# Patient Record
Sex: MALE | Race: WHITE | NOT HISPANIC OR LATINO | URBAN - METROPOLITAN AREA
[De-identification: names, ages, dates, MRNs, and addresses within clinical notes are randomized per-mention and may not be internally consistent; named-entity substitution may affect disease eponyms.]

---

## 2017-06-01 ENCOUNTER — IMPORTED ENCOUNTER (OUTPATIENT)
Dept: URBAN - METROPOLITAN AREA CLINIC 50 | Facility: CLINIC | Age: 72
End: 2017-06-01

## 2018-01-18 ENCOUNTER — IMPORTED ENCOUNTER (OUTPATIENT)
Dept: URBAN - METROPOLITAN AREA CLINIC 50 | Facility: CLINIC | Age: 73
End: 2018-01-18

## 2018-05-24 ENCOUNTER — IMPORTED ENCOUNTER (OUTPATIENT)
Dept: URBAN - METROPOLITAN AREA CLINIC 50 | Facility: CLINIC | Age: 73
End: 2018-05-24

## 2018-11-30 ENCOUNTER — IMPORTED ENCOUNTER (OUTPATIENT)
Dept: URBAN - METROPOLITAN AREA CLINIC 50 | Facility: CLINIC | Age: 73
End: 2018-11-30

## 2019-12-06 ENCOUNTER — IMPORTED ENCOUNTER (OUTPATIENT)
Dept: URBAN - METROPOLITAN AREA CLINIC 50 | Facility: CLINIC | Age: 74
End: 2019-12-06

## 2020-04-21 ENCOUNTER — IMPORTED ENCOUNTER (OUTPATIENT)
Dept: URBAN - METROPOLITAN AREA CLINIC 50 | Facility: CLINIC | Age: 75
End: 2020-04-21

## 2020-05-11 ENCOUNTER — IMPORTED ENCOUNTER (OUTPATIENT)
Dept: URBAN - METROPOLITAN AREA CLINIC 50 | Facility: CLINIC | Age: 75
End: 2020-05-11

## 2020-12-07 ENCOUNTER — IMPORTED ENCOUNTER (OUTPATIENT)
Dept: URBAN - METROPOLITAN AREA CLINIC 50 | Facility: CLINIC | Age: 75
End: 2020-12-07

## 2021-04-17 ASSESSMENT — VISUAL ACUITY
OD_CC: 20/25-1
OS_CC: J1+
OD_CC: 20/30-
OS_PH: 20/50
OS_CC: J1+
OS_OTHER: 20/70. 20/>400.
OD_CC: 20/30-2
OS_CC: 20/30
OD_CC: J1
OS_OTHER: 20/50. 20/80.
OS_CC: 20/40
OS_CC: J1+@ 16 IN
OS_CC: 20/30-2
OD_CC: 20/30+2
OD_CC: J1+@ 16 IN
OS_CC: 20/50-1
OS_CC: 20/40-
OD_CC: J1+
OD_CC: 20/25-
OD_CC: 20/30
OS_OTHER: 20/40. 20/40.
OD_CC: J1+@ 15 IN
OS_CC: J1+
OS_CC: J1
OS_OTHER: 20/70. 20/100.
OD_CC: J1+
OS_BAT: 20/70
OS_CC: J1@ 16 IN
OS_OTHER: 20/30. 20/40.
OS_CC: 20/30
OD_CC: 20/30+
OS_BAT: 20/40
OS_BAT: 20/30
OS_CC: J1+@ 15 IN
OS_CC: 20/30+2
OS_BAT: 20/70
OD_CC: J1@ 16 IN
OS_BAT: 20/50
OD_PH: 20/25
OD_CC: J1+

## 2021-04-17 ASSESSMENT — TONOMETRY
OS_IOP_MMHG: 16
OS_IOP_MMHG: 16
OS_IOP_MMHG: 17
OS_IOP_MMHG: 18
OS_IOP_MMHG: 17
OD_IOP_MMHG: 15
OD_IOP_MMHG: 16
OD_IOP_MMHG: 17
OD_IOP_MMHG: 16
OD_IOP_MMHG: 16
OS_IOP_MMHG: 16
OS_IOP_MMHG: 18

## 2021-05-07 ENCOUNTER — PREPPED CHART (OUTPATIENT)
Dept: URBAN - METROPOLITAN AREA CLINIC 49 | Facility: CLINIC | Age: 76
End: 2021-05-07

## 2021-05-17 ENCOUNTER — COMPREHENSIVE EXAM (OUTPATIENT)
Dept: URBAN - METROPOLITAN AREA CLINIC 49 | Facility: CLINIC | Age: 76
End: 2021-05-17

## 2021-05-17 DIAGNOSIS — H18.511: ICD-10-CM

## 2021-05-17 DIAGNOSIS — H35.3130: ICD-10-CM

## 2021-05-17 DIAGNOSIS — H02.831: ICD-10-CM

## 2021-05-17 DIAGNOSIS — H35.372: ICD-10-CM

## 2021-05-17 DIAGNOSIS — H02.834: ICD-10-CM

## 2021-05-17 PROCEDURE — 92134 CPTRZ OPH DX IMG PST SGM RTA: CPT

## 2021-05-17 PROCEDURE — 92012 INTRM OPH EXAM EST PATIENT: CPT

## 2021-05-17 ASSESSMENT — VISUAL ACUITY
OS_CC: 20/25+1
OD_CC: 20/25-1
OS_GLARE: 20/40
OS_GLARE: 20/30
OU_CC: J1
OU_CC: 20/25+2

## 2021-05-17 ASSESSMENT — TONOMETRY
OS_IOP_MMHG: 14
OD_IOP_MMHG: 13

## 2021-08-17 ENCOUNTER — HOSPITAL ENCOUNTER (OUTPATIENT)
Dept: HOSPITAL 53 - M WUC | Age: 76
End: 2021-08-17
Attending: PHYSICIAN ASSISTANT
Payer: COMMERCIAL

## 2021-08-17 DIAGNOSIS — R05: Primary | ICD-10-CM

## 2021-08-17 DIAGNOSIS — R91.8: ICD-10-CM

## 2021-08-17 NOTE — REP
INDICATION:

R05 COUGH



COMPARISON:

None.



TECHNIQUE:

PA and lateral.



FINDINGS:

A right hilar mass cannot be excluded along with adjacent atelectasis and interstitial

changes.  Remainder of lung fields are well aerated and clear.  No effusion.  No

pneumothorax.  Cardiac silhouette is normal.  Skeletal structures intact.



IMPRESSION:

Suspicious opacity in the right hilum suggesting mass with adjacent atelectasis.

Chest CT with contrast is recommended for further investigation.





<Electronically signed by Salvador Bhatia > 08/17/21 [id number]

## 2021-08-23 ENCOUNTER — HOSPITAL ENCOUNTER (OUTPATIENT)
Dept: HOSPITAL 53 - M WUC | Age: 76
End: 2021-08-23
Attending: PHYSICIAN ASSISTANT
Payer: COMMERCIAL

## 2021-08-23 DIAGNOSIS — R05: Primary | ICD-10-CM

## 2021-08-23 LAB
CREAT SERPL-MCNC: 0.95 MG/DL (ref 0.7–1.3)
GFR SERPL CREATININE-BSD FRML MDRD: > 60 ML/MIN/{1.73_M2} (ref 42–?)

## 2021-08-24 LAB — BUN SERPL-MCNC: 15 MG/DL (ref 7–18)

## 2021-08-26 ENCOUNTER — HOSPITAL ENCOUNTER (OUTPATIENT)
Dept: HOSPITAL 53 - M RAD | Age: 76
End: 2021-08-26
Attending: PHYSICIAN ASSISTANT
Payer: COMMERCIAL

## 2021-08-26 DIAGNOSIS — R05: ICD-10-CM

## 2021-08-26 DIAGNOSIS — R91.8: Primary | ICD-10-CM

## 2021-08-26 PROCEDURE — 71260 CT THORAX DX C+: CPT

## 2021-08-27 NOTE — REP
INDICATION:

OPACITY RT LUNG ABN IMAGING



COMPARISON:

None



TECHNIQUE:

Axial contrast enhanced images from the thoracic inlet to the upper abdomen with

coronal and sagittal reformations using 75 ml Isovue 370 intravenous contrast material.



This CT examination was performed using the following dose reduction techniques:

Automated exposure control, adjustment of mA and/or kv according to the patient's

size, and use of iterative reconstruction technique.



FINDINGS:

Large pathologic conglomerate lymph node complex and or pulmonary mass at the right

hilum measures greater than 5.3 cm diameter and demonstrates elements of rim

enhancement with suspected central necrosis.  This lesion completely fills the right

lower lobe and right middle lobe bronchi.  Mediastinal/subcarinal heterogeneous

enhancing pathologic lymph nodes are also identified.  There is a suspicious mass with

cavitary component in the posterior subpleural apical segment right lower lobe

measuring 3.5 cm diameter along with smaller adjacent 13 mm mass and few small

scattered nonspecific pulmonary nodules primarily identified in the right upper lobe

and left lower lobe.



No effusion.  No pneumothorax.  Further evaluation of the mediastinum demonstrates

atherosclerotic changes to the thoracic aorta and coronary arteries without aortic

aneurysm or dissection.  No cardiomegaly or pericardial effusion.



IMPRESSION:

1. 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

apical segment with few scattered pulmonary nodules in the right hemithorax and left

base.  Significant associated mediastinal and right hilar adenopathy as described

above which envelops the hilar vasculature and fills the right lower lobe and right

middle lobe bronchi extending into the respective segments.





<Electronically signed by Salvador Bhatia > 08/27/21 [id number]